# Patient Record
Sex: FEMALE | Race: ASIAN | NOT HISPANIC OR LATINO | Employment: FULL TIME | URBAN - METROPOLITAN AREA
[De-identification: names, ages, dates, MRNs, and addresses within clinical notes are randomized per-mention and may not be internally consistent; named-entity substitution may affect disease eponyms.]

---

## 2023-12-21 ENCOUNTER — APPOINTMENT (OUTPATIENT)
Dept: CARDIOLOGY | Facility: HOSPITAL | Age: 38
End: 2023-12-21
Payer: COMMERCIAL

## 2023-12-21 ENCOUNTER — HOSPITAL ENCOUNTER (EMERGENCY)
Facility: HOSPITAL | Age: 38
Discharge: OTHER INSTITUTION | End: 2023-12-21
Attending: EMERGENCY MEDICINE
Payer: COMMERCIAL

## 2023-12-21 ENCOUNTER — HOSPITAL ENCOUNTER (OUTPATIENT)
Facility: HOSPITAL | Age: 38
Setting detail: OBSERVATION
End: 2023-12-21
Attending: OBSTETRICS & GYNECOLOGY | Admitting: OBSTETRICS & GYNECOLOGY
Payer: COMMERCIAL

## 2023-12-21 ENCOUNTER — HOSPITAL ENCOUNTER (OUTPATIENT)
Facility: HOSPITAL | Age: 38
Setting detail: OBSERVATION
Discharge: HOME | End: 2023-12-21
Attending: OBSTETRICS & GYNECOLOGY | Admitting: OBSTETRICS & GYNECOLOGY
Payer: COMMERCIAL

## 2023-12-21 VITALS
TEMPERATURE: 97.9 F | WEIGHT: 156.53 LBS | SYSTOLIC BLOOD PRESSURE: 93 MMHG | HEART RATE: 80 BPM | OXYGEN SATURATION: 100 % | RESPIRATION RATE: 22 BRPM | DIASTOLIC BLOOD PRESSURE: 60 MMHG

## 2023-12-21 VITALS
SYSTOLIC BLOOD PRESSURE: 101 MMHG | HEART RATE: 87 BPM | DIASTOLIC BLOOD PRESSURE: 60 MMHG | WEIGHT: 156.53 LBS | BODY MASS INDEX: 30.73 KG/M2 | OXYGEN SATURATION: 100 % | HEIGHT: 60 IN

## 2023-12-21 DIAGNOSIS — Z3A.28 28 WEEKS GESTATION OF PREGNANCY (HHS-HCC): Primary | ICD-10-CM

## 2023-12-21 DIAGNOSIS — U07.1 COVID-19 VIRUS INFECTION: ICD-10-CM

## 2023-12-21 DIAGNOSIS — R10.9 ABDOMINAL PAIN DURING PREGNANCY IN THIRD TRIMESTER (HHS-HCC): ICD-10-CM

## 2023-12-21 DIAGNOSIS — R55 SYNCOPE AND COLLAPSE: Primary | ICD-10-CM

## 2023-12-21 DIAGNOSIS — O26.893 ABDOMINAL PAIN DURING PREGNANCY IN THIRD TRIMESTER (HHS-HCC): ICD-10-CM

## 2023-12-21 DIAGNOSIS — O23.43 URINARY TRACT INFECTION IN MOTHER DURING THIRD TRIMESTER OF PREGNANCY (HHS-HCC): ICD-10-CM

## 2023-12-21 PROBLEM — R73.09 ELEVATED GLUCOSE TOLERANCE TEST: Status: ACTIVE | Noted: 2023-12-21

## 2023-12-21 PROBLEM — O44.40 LOW-LYING PLACENTA (HHS-HCC): Status: ACTIVE | Noted: 2023-10-31

## 2023-12-21 PROBLEM — O99.619 GASTROESOPHAGEAL REFLUX IN PREGNANCY (HHS-HCC): Status: ACTIVE | Noted: 2023-09-11

## 2023-12-21 PROBLEM — K21.9 GASTROESOPHAGEAL REFLUX IN PREGNANCY (HHS-HCC): Status: ACTIVE | Noted: 2023-09-11

## 2023-12-21 PROBLEM — O98.513 COVID-19 AFFECTING PREGNANCY IN THIRD TRIMESTER (HHS-HCC): Status: ACTIVE | Noted: 2023-12-21

## 2023-12-21 LAB
ALBUMIN SERPL BCP-MCNC: 3.1 G/DL (ref 3.4–5)
ALP SERPL-CCNC: 63 U/L (ref 33–110)
ALT SERPL W P-5'-P-CCNC: 17 U/L (ref 7–45)
ANION GAP SERPL CALC-SCNC: 11 MMOL/L (ref 10–20)
AST SERPL W P-5'-P-CCNC: 20 U/L (ref 9–39)
BASOPHILS # BLD AUTO: 0.02 X10*3/UL (ref 0–0.1)
BASOPHILS NFR BLD AUTO: 0.3 %
BILIRUB SERPL-MCNC: 0.4 MG/DL (ref 0–1.2)
BUN SERPL-MCNC: 9 MG/DL (ref 6–23)
CALCIUM SERPL-MCNC: 7.6 MG/DL (ref 8.6–10.3)
CARDIAC TROPONIN I PNL SERPL HS: <3 NG/L (ref 0–13)
CHLORIDE SERPL-SCNC: 109 MMOL/L (ref 98–107)
CO2 SERPL-SCNC: 19 MMOL/L (ref 21–32)
CREAT SERPL-MCNC: 0.44 MG/DL (ref 0.5–1.05)
EOSINOPHIL # BLD AUTO: 0.12 X10*3/UL (ref 0–0.7)
EOSINOPHIL NFR BLD AUTO: 1.5 %
ERYTHROCYTE [DISTWIDTH] IN BLOOD BY AUTOMATED COUNT: 14.4 % (ref 11.5–14.5)
FLUAV RNA RESP QL NAA+PROBE: NOT DETECTED
FLUBV RNA RESP QL NAA+PROBE: NOT DETECTED
GFR SERPL CREATININE-BSD FRML MDRD: >90 ML/MIN/1.73M*2
GLUCOSE BLD MANUAL STRIP-MCNC: 132 MG/DL (ref 74–99)
GLUCOSE SERPL-MCNC: 125 MG/DL (ref 74–99)
HCT VFR BLD AUTO: 32.2 % (ref 36–46)
HGB BLD-MCNC: 10.5 G/DL (ref 12–16)
HOLD SPECIMEN: NORMAL
IMM GRANULOCYTES # BLD AUTO: 0.05 X10*3/UL (ref 0–0.7)
IMM GRANULOCYTES NFR BLD AUTO: 0.6 % (ref 0–0.9)
LYMPHOCYTES # BLD AUTO: 1.34 X10*3/UL (ref 1.2–4.8)
LYMPHOCYTES NFR BLD AUTO: 16.9 %
MCH RBC QN AUTO: 30 PG (ref 26–34)
MCHC RBC AUTO-ENTMCNC: 32.6 G/DL (ref 32–36)
MCV RBC AUTO: 92 FL (ref 80–100)
MONOCYTES # BLD AUTO: 0.4 X10*3/UL (ref 0.1–1)
MONOCYTES NFR BLD AUTO: 5 %
NEUTROPHILS # BLD AUTO: 6.01 X10*3/UL (ref 1.2–7.7)
NEUTROPHILS NFR BLD AUTO: 75.7 %
NRBC BLD-RTO: 0 /100 WBCS (ref 0–0)
PLATELET # BLD AUTO: 200 X10*3/UL (ref 150–450)
POC APPEARANCE, URINE: CLEAR
POC BILIRUBIN, URINE: NEGATIVE
POC BLOOD, URINE: NEGATIVE
POC COLOR, URINE: YELLOW
POC GLUCOSE, URINE: NEGATIVE MG/DL
POC KETONES, URINE: NEGATIVE MG/DL
POC LEUKOCYTES, URINE: ABNORMAL
POC NITRITE,URINE: POSITIVE
POC PH, URINE: 6.5 PH
POC PROTEIN, URINE: NEGATIVE MG/DL
POC SPECIFIC GRAVITY, URINE: 1.02
POC UROBILINOGEN, URINE: 0.2 EU/DL
POTASSIUM SERPL-SCNC: 3.3 MMOL/L (ref 3.5–5.3)
PROT SERPL-MCNC: 5.4 G/DL (ref 6.4–8.2)
RBC # BLD AUTO: 3.5 X10*6/UL (ref 4–5.2)
S PYO DNA THROAT QL NAA+PROBE: NOT DETECTED
SARS-COV-2 RNA RESP QL NAA+PROBE: DETECTED
SODIUM SERPL-SCNC: 136 MMOL/L (ref 136–145)
WBC # BLD AUTO: 7.9 X10*3/UL (ref 4.4–11.3)

## 2023-12-21 PROCEDURE — 2500000004 HC RX 250 GENERAL PHARMACY W/ HCPCS (ALT 636 FOR OP/ED)

## 2023-12-21 PROCEDURE — 87636 SARSCOV2 & INF A&B AMP PRB: CPT | Performed by: EMERGENCY MEDICINE

## 2023-12-21 PROCEDURE — 99213 OFFICE O/P EST LOW 20 MIN: CPT

## 2023-12-21 PROCEDURE — 81002 URINALYSIS NONAUTO W/O SCOPE: CPT

## 2023-12-21 PROCEDURE — 99291 CRITICAL CARE FIRST HOUR: CPT | Performed by: EMERGENCY MEDICINE

## 2023-12-21 PROCEDURE — G0379 DIRECT REFER HOSPITAL OBSERV: HCPCS

## 2023-12-21 PROCEDURE — 59025 FETAL NON-STRESS TEST: CPT

## 2023-12-21 PROCEDURE — 87186 SC STD MICRODIL/AGAR DIL: CPT

## 2023-12-21 PROCEDURE — 80053 COMPREHEN METABOLIC PANEL: CPT | Performed by: EMERGENCY MEDICINE

## 2023-12-21 PROCEDURE — 36415 COLL VENOUS BLD VENIPUNCTURE: CPT | Performed by: EMERGENCY MEDICINE

## 2023-12-21 PROCEDURE — 93005 ELECTROCARDIOGRAM TRACING: CPT

## 2023-12-21 PROCEDURE — G0378 HOSPITAL OBSERVATION PER HR: HCPCS

## 2023-12-21 PROCEDURE — 99215 OFFICE O/P EST HI 40 MIN: CPT | Mod: 25

## 2023-12-21 PROCEDURE — 84484 ASSAY OF TROPONIN QUANT: CPT | Performed by: EMERGENCY MEDICINE

## 2023-12-21 PROCEDURE — 85025 COMPLETE CBC W/AUTO DIFF WBC: CPT | Performed by: EMERGENCY MEDICINE

## 2023-12-21 PROCEDURE — 99292 CRITICAL CARE ADDL 30 MIN: CPT | Performed by: EMERGENCY MEDICINE

## 2023-12-21 PROCEDURE — 87651 STREP A DNA AMP PROBE: CPT | Performed by: EMERGENCY MEDICINE

## 2023-12-21 PROCEDURE — 99285 EMERGENCY DEPT VISIT HI MDM: CPT | Mod: 25

## 2023-12-21 PROCEDURE — 82947 ASSAY GLUCOSE BLOOD QUANT: CPT | Mod: 59

## 2023-12-21 RX ORDER — HYDRALAZINE HYDROCHLORIDE 20 MG/ML
5 INJECTION INTRAMUSCULAR; INTRAVENOUS ONCE AS NEEDED
Status: DISCONTINUED | OUTPATIENT
Start: 2023-12-21 | End: 2023-12-21 | Stop reason: HOSPADM

## 2023-12-21 RX ORDER — LABETALOL HYDROCHLORIDE 5 MG/ML
20 INJECTION, SOLUTION INTRAVENOUS ONCE AS NEEDED
Status: DISCONTINUED | OUTPATIENT
Start: 2023-12-21 | End: 2023-12-21 | Stop reason: HOSPADM

## 2023-12-21 RX ORDER — NIFEDIPINE 10 MG/1
10 CAPSULE ORAL ONCE AS NEEDED
Status: DISCONTINUED | OUTPATIENT
Start: 2023-12-21 | End: 2023-12-21 | Stop reason: HOSPADM

## 2023-12-21 RX ORDER — POTASSIUM CHLORIDE 20 MEQ/1
40 TABLET, EXTENDED RELEASE ORAL ONCE
Status: COMPLETED | OUTPATIENT
Start: 2023-12-21 | End: 2023-12-21

## 2023-12-21 RX ORDER — LIDOCAINE HYDROCHLORIDE 10 MG/ML
0.5 INJECTION INFILTRATION; PERINEURAL ONCE AS NEEDED
Status: DISCONTINUED | OUTPATIENT
Start: 2023-12-21 | End: 2023-12-21 | Stop reason: HOSPADM

## 2023-12-21 RX ORDER — NITROFURANTOIN 25; 75 MG/1; MG/1
100 CAPSULE ORAL 2 TIMES DAILY
Qty: 10 CAPSULE | Refills: 0 | Status: SHIPPED | OUTPATIENT
Start: 2023-12-21 | End: 2023-12-26

## 2023-12-21 RX ADMIN — POTASSIUM CHLORIDE 40 MEQ: 1500 TABLET, EXTENDED RELEASE ORAL at 17:02

## 2023-12-21 SDOH — SOCIAL STABILITY: SOCIAL INSECURITY: DOES ANYONE TRY TO KEEP YOU FROM HAVING/CONTACTING OTHER FRIENDS OR DOING THINGS OUTSIDE YOUR HOME?: NO

## 2023-12-21 SDOH — SOCIAL STABILITY: SOCIAL INSECURITY: DO YOU FEEL ANYONE HAS EXPLOITED OR TAKEN ADVANTAGE OF YOU FINANCIALLY OR OF YOUR PERSONAL PROPERTY?: NO

## 2023-12-21 SDOH — ECONOMIC STABILITY: HOUSING INSECURITY: DO YOU FEEL UNSAFE GOING BACK TO THE PLACE WHERE YOU ARE LIVING?: NO

## 2023-12-21 SDOH — SOCIAL STABILITY: SOCIAL INSECURITY: ABUSE SCREEN: ADULT

## 2023-12-21 SDOH — SOCIAL STABILITY: SOCIAL INSECURITY: VERBAL ABUSE: DENIES

## 2023-12-21 SDOH — SOCIAL STABILITY: SOCIAL INSECURITY: PHYSICAL ABUSE: DENIES

## 2023-12-21 SDOH — HEALTH STABILITY: MENTAL HEALTH: WERE YOU ABLE TO COMPLETE ALL THE BEHAVIORAL HEALTH SCREENINGS?: YES

## 2023-12-21 SDOH — HEALTH STABILITY: MENTAL HEALTH: HAVE YOU USED ANY SUBSTANCES (CANABIS, COCAINE, HEROIN, HALLUCINOGENS, INHALANTS, ETC.) IN THE PAST 12 MONTHS?: NO

## 2023-12-21 SDOH — HEALTH STABILITY: MENTAL HEALTH: WISH TO BE DEAD (PAST 1 MONTH): NO

## 2023-12-21 SDOH — SOCIAL STABILITY: SOCIAL INSECURITY: HAVE YOU HAD THOUGHTS OF HARMING ANYONE ELSE?: NO

## 2023-12-21 SDOH — SOCIAL STABILITY: SOCIAL INSECURITY: ARE THERE ANY APPARENT SIGNS OF INJURIES/BEHAVIORS THAT COULD BE RELATED TO ABUSE/NEGLECT?: NO

## 2023-12-21 SDOH — HEALTH STABILITY: MENTAL HEALTH: NON-SPECIFIC ACTIVE SUICIDAL THOUGHTS (PAST 1 MONTH): NO

## 2023-12-21 SDOH — SOCIAL STABILITY: SOCIAL INSECURITY: ARE YOU OR HAVE YOU BEEN THREATENED OR ABUSED PHYSICALLY, EMOTIONALLY, OR SEXUALLY BY ANYONE?: NO

## 2023-12-21 SDOH — HEALTH STABILITY: MENTAL HEALTH: SUICIDAL BEHAVIOR (LIFETIME): NO

## 2023-12-21 SDOH — SOCIAL STABILITY: SOCIAL INSECURITY: HAS ANYONE EVER THREATENED TO HURT YOUR FAMILY OR YOUR PETS?: NO

## 2023-12-21 SDOH — HEALTH STABILITY: MENTAL HEALTH: HAVE YOU USED ANY PRESCRIPTION DRUGS OTHER THAN PRESCRIBED IN THE PAST 12 MONTHS?: NO

## 2023-12-21 ASSESSMENT — PAIN - FUNCTIONAL ASSESSMENT: PAIN_FUNCTIONAL_ASSESSMENT: 0-10

## 2023-12-21 ASSESSMENT — LIFESTYLE VARIABLES
HOW MANY STANDARD DRINKS CONTAINING ALCOHOL DO YOU HAVE ON A TYPICAL DAY: PATIENT DOES NOT DRINK
AUDIT-C TOTAL SCORE: 0
HOW OFTEN DO YOU HAVE A DRINK CONTAINING ALCOHOL: NEVER
HOW OFTEN DO YOU HAVE 6 OR MORE DRINKS ON ONE OCCASION: NEVER
AUDIT-C TOTAL SCORE: 0
SKIP TO QUESTIONS 9-10: 1

## 2023-12-21 ASSESSMENT — PATIENT HEALTH QUESTIONNAIRE - PHQ9
2. FEELING DOWN, DEPRESSED OR HOPELESS: NOT AT ALL
1. LITTLE INTEREST OR PLEASURE IN DOING THINGS: NOT AT ALL
SUM OF ALL RESPONSES TO PHQ9 QUESTIONS 1 & 2: 0

## 2023-12-21 ASSESSMENT — PAIN DESCRIPTION - LOCATION: LOCATION: ABDOMEN

## 2023-12-21 NOTE — ED PROVIDER NOTES
HPI   Chief Complaint   Patient presents with    Syncope       38-year-old female G1, P0 at 29 weeks by ultrasound yesterday.  She had a syncopal event today reportedly while waiting in the lobby for glucose test.  It is unclear whether she actually drink the solution before hand.  She does not speak English but the [employer]  service was used.  She is here with her .  She had a syncopal event but states that she felt fine before hand.  They are unsure which hospital they were planning to deliver at.  She started having abdominal cramping and pain afterwards.  She denies loss of fluid or vaginal bleeding.      History provided by:  Spouse and EMS personnel   used: Yes                        Rosaura Coma Scale Score: 15                  Patient History   No past medical history on file.  No past surgical history on file.  No family history on file.  Social History     Tobacco Use    Smoking status: Not on file    Smokeless tobacco: Not on file   Substance Use Topics    Alcohol use: Not on file    Drug use: Not on file       Physical Exam   ED Triage Vitals   Temp Heart Rate Resp BP   12/21/23 1157 12/21/23 1157 12/21/23 1157 12/21/23 1159   36.6 °C (97.9 °F) 85 18 104/69      SpO2 Temp Source Heart Rate Source Patient Position   12/21/23 1159 12/21/23 1157 12/21/23 1157 --   99 % Temporal Monitor       BP Location FiO2 (%)     -- --             Physical Exam  Vitals and nursing note reviewed.   Constitutional:       General: She is in acute distress.      Appearance: She is well-developed. She is ill-appearing.   HENT:      Head: Normocephalic and atraumatic.   Eyes:      Conjunctiva/sclera: Conjunctivae normal.   Cardiovascular:      Rate and Rhythm: Normal rate and regular rhythm.      Heart sounds: No murmur heard.  Pulmonary:      Effort: Pulmonary effort is normal. No respiratory distress.      Breath sounds: Normal breath sounds.   Abdominal:      Tenderness: There is no  abdominal tenderness.      Comments: Gravid uterus   Musculoskeletal:         General: No swelling.      Cervical back: Neck supple.   Skin:     General: Skin is warm and dry.      Capillary Refill: Capillary refill takes less than 2 seconds.   Neurological:      Mental Status: She is alert.   Psychiatric:         Mood and Affect: Mood normal.         ED Course & MDM   ED Course as of 12/21/23 1256   Thu Dec 21, 2023   1204 Patient was accepted by Dr. Alexander at [facility]. [CD]   1246 Resting comfortably.  Pain has resolved.  No loss of fluids or bleeding.  Ultrasound reveals fetal movement and cardiac activity. [CD]      ED Course User Index  [CD] Malik Londono MD         Diagnoses as of 12/21/23 1256   Syncope and collapse   Abdominal pain during pregnancy in third trimester       Medical Decision Making  I performed a bedside ultrasound.  Fetal heart tones are 140s.  There is active fetal movement.  I discussed the case with Dr. Alexander at [facility] and she was accepted for emergent transport.  She appears stable for transport at this time.  She was accepted at [facility].    EKG interpreted by attending physician.  Sinus rhythm.  Rate 89.  No acute ischemic changes.  No ST elevation.  QTc is 418.        Procedure  Critical Care    Performed by: Malik Londono MD  Authorized by: Malik Londono MD    Critical care provider statement:     Critical care time (minutes):  35    Critical care time was exclusive of:  Separately billable procedures and treating other patients    Critical care was time spent personally by me on the following activities:  Discussions with consultants, discussions with primary provider, evaluation of patient's response to treatment, examination of patient, re-evaluation of patient's condition, ordering and review of radiographic studies, ordering and review of laboratory studies and ordering and performing treatments and interventions    I assumed direction of  critical care for this patient from another provider in my specialty: yes         Malik Londono MD  12/21/23 1210       Malik Londono MD  12/21/23 1257

## 2023-12-21 NOTE — ED TRIAGE NOTES
Pt to ED via medic c/o syncopal episode during glucose test. Pt is 29 weeks pregnant c/o abdominal pain that started about 10 minutes pta. First term pregnancy for patient.  states she was feeling fine this morning prior to testing. Pt speaks [place] and  was used. Provider at bedside with US. Fetal heart tones =140. Pt pale diaphoretic and alert to speech/stimuli

## 2023-12-22 NOTE — H&P
Obstetrical Admission History and Physical     Alverto Sow is a 38 y.o.  at 28.5 wga by chart review presenting to OB TRIAGE from [place] ED after presyncopal episode    Chief Complaint: No chief complaint on file.    Assessment/Plan      Presyncope, COVID infection, UTI  - EKG NSR, no ST changes at [place] [facility]  - troponin negative  - s/p IV fluids on arrival to OB triage  - Orthostatics negative  - urine dip s/f SG 1.020 and + nitrites  - presyncope in s/o COVID illness, likely dehydration  - presumptively treating for UTI, will follow up urine culture  - macrobid x 5 days sent to pharmacy  - COVID PCR positive  - Congestion and sore throat x 2 days, candidate for Paxlovid  - counseled on Paxlovid, patient accepts, rx sent  - tylenol, rest, fluids encouraged  - note for work provided- mask x 5 days after returning to work no sooner than after day 5 of symptom onset  - counseled on precautions for partner: mask x 10 days  - visit conducted with [employer]  through [employer]  - return precautions reviewed, patient verbalizes understanding    IUP at 28.5 wga  - NST appropriate for gestational age   - Good fetal movement  - Continue routine prenatal care  - Next appt with [facility] for PNC    Maternal Well-being  - Vital signs stable and WNL  - All questions and concerns addressed     Dispo  - patient appropriate for d/c home, agrees with plan  - f/u at next scheduled OB appt or to triage sooner as needed     Tracing and plan reviewed with Dr. Alexander    Active Problems:    Elevated glucose tolerance test    COVID-19 affecting pregnancy in third trimester    Urinary tract infection in mother during third trimester of pregnancy    Syncope, near      Pregnancy Problems (from 23 to present)       No problems associated with this episode.          Subjective   Alverto is here after presyncopal event while presenting for 3 hr GTT. Good fetal movement. Denies vaginal bleeding., Denies contractions., Denies leaking of  fluid.      Patient reports feeling weak and had ringing in her ears when she tried to drink the glucose drink. She said her blood pressure was low. She had abdominal pain twice in the ambulance on the way here, lasted for about 1 min, has not experienced it since.      Obstetrical History   OB History    Para Term  AB Living   2       1     SAB IAB Ectopic Multiple Live Births   1              # Outcome Date GA Lbr Sushil/2nd Weight Sex Delivery Anes PTL Lv   2 Current            1 SAB                Past Medical History  No past medical history on file.     Past Surgical History   No past surgical history on file.    Social History  Social History     Tobacco Use    Smoking status: Not on file    Smokeless tobacco: Not on file   Substance Use Topics    Alcohol use: Not on file     Substance and Sexual Activity   Drug Use Not on file       Allergies  Patient has no known allergies.     Medications  No medications prior to admission.       Objective    Last Vitals  Temp Pulse Resp BP MAP O2 Sat     87   101/60 (standing)   100 %     Physical Examination  FHR is 135 , with Accelerations, and a category I  tracing.    [place] reading:  no ctx  General: Examination reveals a well developed, well nourished, female, in no acute distress. She is alert and cooperative.  Lungs: symmetrical, non-labored breathing.  Cardiac: warm, well-perfused.  Abdomen: soft, non-tender, gravid. Suprapubic tenderness  Extremities: no redness or tenderness in the calves or thighs. Negative CVA tenderness  Neurological: alert, oriented, normal speech, no focal findings or movement disorder noted.     Non-Stress Test   Baseline Fetal Heart Rate for Non-Stress Test: 135 BPM  Variability in Waveform for Non-Stress Test: Moderate  Accelerations in Non-Stress Test: Yes, lasting at least 10 seconds  Decelerations in Non-Stress Test: None  Contractions in Non-Stress Test: Not present  Acoustic Stimulator for Non-Stress Test:  No  Interpretation of Non-Stress Test   Interpretation of Non-Stress Test: Reactive   Lab Review  Labs in chart were reviewed.

## 2023-12-23 ENCOUNTER — TELEPHONE (OUTPATIENT)
Dept: OBSTETRICS AND GYNECOLOGY | Facility: HOSPITAL | Age: 38
End: 2023-12-23
Payer: COMMERCIAL

## 2023-12-23 LAB — BACTERIA UR CULT: ABNORMAL

## 2023-12-23 NOTE — TELEPHONE ENCOUNTER
Attempted to call pt to review urine cx results. Pt was seen in triage on 12/21 and script for Macrobid was sent. Macrobid good tx based on sensitivities.     Pt's  answered the phone. No information was discussed. Will attempt to call pt back.    Gisela Freedman, KIAN-CNP

## 2024-01-10 LAB
ATRIAL RATE: 89 BPM
P AXIS: 80 DEGREES
P OFFSET: 200 MS
P ONSET: 148 MS
PR INTERVAL: 158 MS
Q ONSET: 227 MS
QRS COUNT: 14 BEATS
QRS DURATION: 72 MS
QT INTERVAL: 344 MS
QTC CALCULATION(BAZETT): 418 MS
QTC FREDERICIA: 392 MS
R AXIS: 12 DEGREES
T AXIS: 31 DEGREES
T OFFSET: 399 MS
VENTRICULAR RATE: 89 BPM

## 2024-02-14 ENCOUNTER — HOSPITAL ENCOUNTER (EMERGENCY)
Facility: HOSPITAL | Age: 39
Discharge: LEFT BEFORE DISCHARGE | End: 2024-02-14
Attending: INTERNAL MEDICINE
Payer: COMMERCIAL

## 2024-02-14 VITALS
BODY MASS INDEX: 27.6 KG/M2 | SYSTOLIC BLOOD PRESSURE: 106 MMHG | WEIGHT: 150 LBS | HEART RATE: 81 BPM | HEIGHT: 62 IN | RESPIRATION RATE: 18 BRPM | OXYGEN SATURATION: 99 % | TEMPERATURE: 98.1 F | DIASTOLIC BLOOD PRESSURE: 67 MMHG

## 2024-02-14 DIAGNOSIS — O36.8130 DECREASED FETAL MOVEMENTS IN THIRD TRIMESTER, SINGLE OR UNSPECIFIED FETUS (HHS-HCC): Primary | ICD-10-CM

## 2024-02-14 PROCEDURE — 76705 ECHO EXAM OF ABDOMEN: CPT | Performed by: INTERNAL MEDICINE

## 2024-02-14 PROCEDURE — 99285 EMERGENCY DEPT VISIT HI MDM: CPT | Mod: 25 | Performed by: INTERNAL MEDICINE

## 2024-02-14 PROCEDURE — 99284 EMERGENCY DEPT VISIT MOD MDM: CPT | Mod: 25

## 2024-02-14 ASSESSMENT — COLUMBIA-SUICIDE SEVERITY RATING SCALE - C-SSRS
1. IN THE PAST MONTH, HAVE YOU WISHED YOU WERE DEAD OR WISHED YOU COULD GO TO SLEEP AND NOT WAKE UP?: NO
6. HAVE YOU EVER DONE ANYTHING, STARTED TO DO ANYTHING, OR PREPARED TO DO ANYTHING TO END YOUR LIFE?: NO
2. HAVE YOU ACTUALLY HAD ANY THOUGHTS OF KILLING YOURSELF?: NO

## 2024-02-14 ASSESSMENT — PAIN DESCRIPTION - PROGRESSION: CLINICAL_PROGRESSION: NOT CHANGED

## 2024-02-14 ASSESSMENT — PAIN - FUNCTIONAL ASSESSMENT: PAIN_FUNCTIONAL_ASSESSMENT: 0-10

## 2024-02-14 ASSESSMENT — LIFESTYLE VARIABLES
HAVE YOU EVER FELT YOU SHOULD CUT DOWN ON YOUR DRINKING: NO
EVER HAD A DRINK FIRST THING IN THE MORNING TO STEADY YOUR NERVES TO GET RID OF A HANGOVER: NO
HAVE PEOPLE ANNOYED YOU BY CRITICIZING YOUR DRINKING: NO
EVER FELT BAD OR GUILTY ABOUT YOUR DRINKING: NO

## 2024-02-14 ASSESSMENT — PAIN SCALES - GENERAL: PAINLEVEL_OUTOF10: 0 - NO PAIN

## 2024-02-15 NOTE — ED PROVIDER NOTES
HPI   Chief Complaint   Patient presents with    Pregnancy Problem     Patient presents via triage from home. Pt sts she is 9mos pregnant and has not felt baby move in 2 hours. Pt sts had  Appointment today w/ ultrasound and was told all vitals and OB normal. Pt ambulated from triage. POC ultrasound conducted by provider at bedside fetal .         services were utilized for this emergency room evaluation.    Patient is anxious appearing 39-year-old female with past medical history of esophageal reflux, overlying send, COVID-19, UTI, near syncope, presents to the emergency today for complaint of decreased fetal movement.  Patient states earlier today she began to feel decreased movement during pregnancy and she is concerned fetus may be experiencing complications.  Patient denies any abdominal pain with nausea vomiting or diarrhea or constipation.  Patient denies any vaginal discharge or vaginal bleeding.  Patient denies any urinary complaints.  Patient states she was seen and evaluated by OB/GYN earlier today and was told evaluation was normal and to follow-up as needed.  Patient states she does have appointment see OB/GYN tomorrow.  Discussed with                          Rosaura Coma Scale Score: 15                     Patient History   No past medical history on file.  No past surgical history on file.  No family history on file.  Social History     Tobacco Use    Smoking status: Not on file    Smokeless tobacco: Not on file   Substance Use Topics    Alcohol use: Not on file    Drug use: Not on file       Physical Exam   ED Triage Vitals [02/14/24 2320]   Temperature Heart Rate Respirations BP   36.7 °C (98.1 °F) 83 18 128/81      Pulse Ox Temp Source Heart Rate Source Patient Position   100 % Temporal Monitor Lying      BP Location FiO2 (%)     -- --       Physical Exam  Vitals and nursing note reviewed. Exam conducted with a chaperone present.   Constitutional:       General: She is not in  acute distress.     Appearance: Normal appearance. She is not ill-appearing, toxic-appearing or diaphoretic.   HENT:      Head: Normocephalic.      Nose: Nose normal.      Mouth/Throat:      Mouth: Mucous membranes are moist.   Eyes:      Extraocular Movements: Extraocular movements intact.      Pupils: Pupils are equal, round, and reactive to light.   Cardiovascular:      Rate and Rhythm: Normal rate and regular rhythm.      Pulses: Normal pulses.      Heart sounds: Normal heart sounds. No murmur heard.     No friction rub. No gallop.   Pulmonary:      Effort: Pulmonary effort is normal. No respiratory distress.      Breath sounds: Normal breath sounds. No stridor. No wheezing, rhonchi or rales.   Chest:      Chest wall: No tenderness.   Abdominal:      General: Bowel sounds are normal. There is no distension.      Palpations: There is no mass.      Tenderness: There is no abdominal tenderness. There is no right CVA tenderness, left CVA tenderness, guarding or rebound.      Hernia: No hernia is present.   Musculoskeletal:         General: Normal range of motion.      Cervical back: Normal range of motion and neck supple.   Skin:     General: Skin is warm and dry.      Capillary Refill: Capillary refill takes less than 2 seconds.      Coloration: Skin is not jaundiced or pale.      Findings: No bruising, erythema, lesion or rash.   Neurological:      Mental Status: She is alert.         ED Course & MDM   ED Course as of 02/14/24 2349 Wed Feb 14, 2024 2343 Discussed with Dr. Cheema (OB) and it is recommended that the patient be transported for NST.  Patient does not wish to go by ambulance.  Patient wishes to have her spouse drive her to [facility] as this is where she has her care provided. [JA]   2344 I discussed further with the patient using  service regarding the potential risk and benefits of going by private vehicle and that this would be AGAINST MEDICAL ADVICE. [JA]   2345 After  discussion of leaving AGAINST MEDICAL ADVICE patient indicates that she now feels the child moving.  Patient notes that she does have follow-up with OB tomorrow.  I have again stressed that this would be AGAINST MEDICAL ADVICE that the patient does need to follow-up as soon as possible.  It is recommended the patient go directly to [facility] or [facility] for further evaluation. [JA]      ED Course User Index  [JA] Burke RIVERO DO Memo         Diagnoses as of 24 2349   Decreased fetal movements in third trimester, single or unspecified fetus       Medical Decision Making  Given patient's complaint presentation a thorough exam was performed.  Upon arrival emergency room patient was tearful and upset that there was decreased fetal movement.  Fetal heart tones were performed bedside the ultrasound does reveal fetal heart tone of 132.  Patient has no reproduced abdominal tenderness upon palpation bowel sounds present all 4 quadrants, no CVA tenderness upon palpation, denies any vaginal discharge or vaginal bleeding, previous documentation from evaluation earlier today was reviewed as well and does confirm patient is estimated 36 weeks and 4-days pregnant.  Patient states this is her first pregnancy,  1 para 0.  [facility] was utilized to consult OB/GYN at [facility].  OB/GYN recommends evaluation at [facility].  This information was not relayed to patient.  Patient was reluctant to stop this information is opted to go to [facility] where her OB/GYN practices.  Risks and benefits were discussed with patient regards to leaving without being evaluated or without having medical transportation.  Patient has full mental capacity understands these risks and leaving AGAINST MEDICAL ADVICE.  Risks associated without being evaluated including short-term disability, long-term disability, fetal complications, death were discussed.  Patient is still refusing evaluation and is  signed AGAINST MEDICAL ADVICE form.  Patient has signed AGAINST MEDICAL ADVICE form.    VALORIE Porras     Portions of this note were generated using digital voice recognition software, and may contain grammatical errors      Procedure  Procedures     VALORIE Porras  02/14/24 7932

## 2024-02-15 NOTE — ED PROCEDURE NOTE
Procedure    Performed by: Burke Hampton DO  Authorized by: Burke Hampton DO        Genitourinary Indications: evaluation for fetal cardiac activity            Comments: Point-of-care abdominal OB ultrasound performed as the patient is concerned that she is not having any fetal activity.  While I do not see overt fetal motion there is fetal heart tones.  Fetal heart rate 132.  Fetus is head down into the pelvis.  Patient indicates this was the position of the fetus this morning at previous ultrasound.               Burke Hampton DO  02/14/24 4428

## 2024-03-06 ENCOUNTER — APPOINTMENT (OUTPATIENT)
Dept: RADIOLOGY | Facility: HOSPITAL | Age: 39
End: 2024-03-06
Payer: COMMERCIAL

## 2024-03-06 ENCOUNTER — HOSPITAL ENCOUNTER (EMERGENCY)
Facility: HOSPITAL | Age: 39
Discharge: LEFT BEFORE DISCHARGE | End: 2024-03-07
Attending: STUDENT IN AN ORGANIZED HEALTH CARE EDUCATION/TRAINING PROGRAM
Payer: COMMERCIAL

## 2024-03-06 DIAGNOSIS — R33.9 URINARY RETENTION: ICD-10-CM

## 2024-03-06 DIAGNOSIS — K59.00 CONSTIPATION, UNSPECIFIED CONSTIPATION TYPE: Primary | ICD-10-CM

## 2024-03-06 DIAGNOSIS — R10.9 ABDOMINAL PAIN, UNSPECIFIED ABDOMINAL LOCATION: ICD-10-CM

## 2024-03-06 LAB
ALBUMIN SERPL BCP-MCNC: 3 G/DL (ref 3.4–5)
ALP SERPL-CCNC: 107 U/L (ref 33–110)
ALT SERPL W P-5'-P-CCNC: 24 U/L (ref 7–45)
ANION GAP SERPL CALC-SCNC: 15 MMOL/L (ref 10–20)
APPEARANCE UR: ABNORMAL
AST SERPL W P-5'-P-CCNC: 35 U/L (ref 9–39)
BASOPHILS # BLD AUTO: 0.04 X10*3/UL (ref 0–0.1)
BASOPHILS NFR BLD AUTO: 0.4 %
BILIRUB SERPL-MCNC: 0.4 MG/DL (ref 0–1.2)
BILIRUB UR STRIP.AUTO-MCNC: NEGATIVE MG/DL
BUN SERPL-MCNC: 7 MG/DL (ref 6–23)
CALCIUM SERPL-MCNC: 8.5 MG/DL (ref 8.6–10.3)
CHLORIDE SERPL-SCNC: 110 MMOL/L (ref 98–107)
CO2 SERPL-SCNC: 18 MMOL/L (ref 21–32)
COLOR UR: YELLOW
CREAT SERPL-MCNC: 0.53 MG/DL (ref 0.5–1.05)
EGFRCR SERPLBLD CKD-EPI 2021: >90 ML/MIN/1.73M*2
EOSINOPHIL # BLD AUTO: 0.15 X10*3/UL (ref 0–0.7)
EOSINOPHIL NFR BLD AUTO: 1.6 %
ERYTHROCYTE [DISTWIDTH] IN BLOOD BY AUTOMATED COUNT: 16 % (ref 11.5–14.5)
GLUCOSE SERPL-MCNC: 75 MG/DL (ref 74–99)
GLUCOSE UR STRIP.AUTO-MCNC: NEGATIVE MG/DL
HCT VFR BLD AUTO: 31 % (ref 36–46)
HGB BLD-MCNC: 10.2 G/DL (ref 12–16)
IMM GRANULOCYTES # BLD AUTO: 0.12 X10*3/UL (ref 0–0.7)
IMM GRANULOCYTES NFR BLD AUTO: 1.2 % (ref 0–0.9)
KETONES UR STRIP.AUTO-MCNC: NEGATIVE MG/DL
LEUKOCYTE ESTERASE UR QL STRIP.AUTO: ABNORMAL
LIPASE SERPL-CCNC: 34 U/L (ref 9–82)
LYMPHOCYTES # BLD AUTO: 1.34 X10*3/UL (ref 1.2–4.8)
LYMPHOCYTES NFR BLD AUTO: 13.9 %
MCH RBC QN AUTO: 29.3 PG (ref 26–34)
MCHC RBC AUTO-ENTMCNC: 32.9 G/DL (ref 32–36)
MCV RBC AUTO: 89 FL (ref 80–100)
MONOCYTES # BLD AUTO: 0.6 X10*3/UL (ref 0.1–1)
MONOCYTES NFR BLD AUTO: 6.2 %
NEUTROPHILS # BLD AUTO: 7.4 X10*3/UL (ref 1.2–7.7)
NEUTROPHILS NFR BLD AUTO: 76.7 %
NITRITE UR QL STRIP.AUTO: NEGATIVE
NRBC BLD-RTO: 0 /100 WBCS (ref 0–0)
PH UR STRIP.AUTO: 6 [PH]
PLATELET # BLD AUTO: 222 X10*3/UL (ref 150–450)
POTASSIUM SERPL-SCNC: 3.6 MMOL/L (ref 3.5–5.3)
PROT SERPL-MCNC: 5.5 G/DL (ref 6.4–8.2)
PROT UR STRIP.AUTO-MCNC: NEGATIVE MG/DL
RBC # BLD AUTO: 3.48 X10*6/UL (ref 4–5.2)
RBC # UR STRIP.AUTO: ABNORMAL /UL
RBC #/AREA URNS AUTO: >20 /HPF
SODIUM SERPL-SCNC: 139 MMOL/L (ref 136–145)
SP GR UR STRIP.AUTO: 1.01
SQUAMOUS #/AREA URNS AUTO: ABNORMAL /HPF
UROBILINOGEN UR STRIP.AUTO-MCNC: <2 MG/DL
WBC # BLD AUTO: 9.7 X10*3/UL (ref 4.4–11.3)
WBC #/AREA URNS AUTO: ABNORMAL /HPF

## 2024-03-06 PROCEDURE — 76856 US EXAM PELVIC COMPLETE: CPT | Performed by: RADIOLOGY

## 2024-03-06 PROCEDURE — 83690 ASSAY OF LIPASE: CPT | Performed by: STUDENT IN AN ORGANIZED HEALTH CARE EDUCATION/TRAINING PROGRAM

## 2024-03-06 PROCEDURE — 96365 THER/PROPH/DIAG IV INF INIT: CPT

## 2024-03-06 PROCEDURE — 74177 CT ABD & PELVIS W/CONTRAST: CPT

## 2024-03-06 PROCEDURE — 74177 CT ABD & PELVIS W/CONTRAST: CPT | Performed by: RADIOLOGY

## 2024-03-06 PROCEDURE — 36415 COLL VENOUS BLD VENIPUNCTURE: CPT | Performed by: STUDENT IN AN ORGANIZED HEALTH CARE EDUCATION/TRAINING PROGRAM

## 2024-03-06 PROCEDURE — 81001 URINALYSIS AUTO W/SCOPE: CPT | Performed by: STUDENT IN AN ORGANIZED HEALTH CARE EDUCATION/TRAINING PROGRAM

## 2024-03-06 PROCEDURE — 99285 EMERGENCY DEPT VISIT HI MDM: CPT | Mod: 25

## 2024-03-06 PROCEDURE — 80053 COMPREHEN METABOLIC PANEL: CPT | Performed by: STUDENT IN AN ORGANIZED HEALTH CARE EDUCATION/TRAINING PROGRAM

## 2024-03-06 PROCEDURE — 76856 US EXAM PELVIC COMPLETE: CPT

## 2024-03-06 PROCEDURE — 2550000001 HC RX 255 CONTRASTS: Performed by: STUDENT IN AN ORGANIZED HEALTH CARE EDUCATION/TRAINING PROGRAM

## 2024-03-06 PROCEDURE — 85025 COMPLETE CBC W/AUTO DIFF WBC: CPT | Performed by: STUDENT IN AN ORGANIZED HEALTH CARE EDUCATION/TRAINING PROGRAM

## 2024-03-06 RX ORDER — ACETAMINOPHEN 325 MG/1
650 TABLET ORAL ONCE
Status: COMPLETED | OUTPATIENT
Start: 2024-03-06 | End: 2024-03-06

## 2024-03-06 RX ORDER — LIDOCAINE HYDROCHLORIDE 20 MG/ML
1 JELLY TOPICAL ONCE
Status: COMPLETED | OUTPATIENT
Start: 2024-03-06 | End: 2024-03-06

## 2024-03-06 RX ORDER — CEFTRIAXONE 1 G/50ML
1 INJECTION, SOLUTION INTRAVENOUS ONCE
Status: COMPLETED | OUTPATIENT
Start: 2024-03-06 | End: 2024-03-07

## 2024-03-06 RX ADMIN — IOHEXOL 75 ML: 350 INJECTION, SOLUTION INTRAVENOUS at 22:43

## 2024-03-06 RX ADMIN — ACETAMINOPHEN 650 MG: 325 TABLET ORAL at 22:17

## 2024-03-06 ASSESSMENT — PAIN - FUNCTIONAL ASSESSMENT
PAIN_FUNCTIONAL_ASSESSMENT: 0-10

## 2024-03-06 ASSESSMENT — PAIN SCALES - GENERAL
PAINLEVEL_OUTOF10: 10 - WORST POSSIBLE PAIN
PAINLEVEL_OUTOF10: 10 - WORST POSSIBLE PAIN
PAINLEVEL_OUTOF10: 0 - NO PAIN
PAINLEVEL_OUTOF10: 10 - WORST POSSIBLE PAIN

## 2024-03-06 ASSESSMENT — PAIN DESCRIPTION - ORIENTATION: ORIENTATION: LOWER

## 2024-03-06 ASSESSMENT — PAIN DESCRIPTION - DESCRIPTORS: DESCRIPTORS: SHARP

## 2024-03-06 ASSESSMENT — PAIN DESCRIPTION - LOCATION
LOCATION: VAGINA
LOCATION: BACK
LOCATION: BACK

## 2024-03-07 VITALS
WEIGHT: 163.36 LBS | BODY MASS INDEX: 27.89 KG/M2 | DIASTOLIC BLOOD PRESSURE: 64 MMHG | HEART RATE: 75 BPM | SYSTOLIC BLOOD PRESSURE: 107 MMHG | OXYGEN SATURATION: 98 % | HEIGHT: 64 IN | RESPIRATION RATE: 18 BRPM

## 2024-03-07 PROCEDURE — 96365 THER/PROPH/DIAG IV INF INIT: CPT | Mod: 59

## 2024-03-07 PROCEDURE — 2500000004 HC RX 250 GENERAL PHARMACY W/ HCPCS (ALT 636 FOR OP/ED): Performed by: STUDENT IN AN ORGANIZED HEALTH CARE EDUCATION/TRAINING PROGRAM

## 2024-03-07 PROCEDURE — 2500000001 HC RX 250 WO HCPCS SELF ADMINISTERED DRUGS (ALT 637 FOR MEDICARE OP): Performed by: STUDENT IN AN ORGANIZED HEALTH CARE EDUCATION/TRAINING PROGRAM

## 2024-03-07 PROCEDURE — 51702 INSERT TEMP BLADDER CATH: CPT

## 2024-03-07 RX ORDER — SYRING-NEEDL,DISP,INSUL,0.3 ML 29 G X1/2"
296 SYRINGE, EMPTY DISPOSABLE MISCELLANEOUS ONCE
Status: COMPLETED | OUTPATIENT
Start: 2024-03-07 | End: 2024-03-07

## 2024-03-07 RX ADMIN — BE HEALTH MAGNESIUM CITRATE ORAL SOLUTION - CHERRY 296 ML: 1.75 LIQUID ORAL at 03:36

## 2024-03-07 RX ADMIN — CEFTRIAXONE SODIUM 1 G: 1 INJECTION, SOLUTION INTRAVENOUS at 00:52

## 2024-03-07 NOTE — ED PROVIDER NOTES
EMERGENCY DEPARTMENT ENCOUNTER      Pt Name: Alverto Sow  MRN: [id number]  Birthdate 1985  Date of evaluation: 3/6/2024  Provider: Riley Mcdermott DO    CHIEF COMPLAINT       Chief Complaint   Patient presents with    Constipation     Patient BIBA, EMS states per  patient is 3 days postpartum. Patient was discharged from [facility] today, after arriving at home patient began having lower back pain. Patient is rating pain 10/10 and states she hasn't had a bowel movement since Saturday.        HISTORY OF PRESENT ILLNESS    Alverto Sow is a 39 y.o. female who presents to the emergency department with  for lower pelvic pain starting this afternoon.  Patient states that she has not had a bowel movement since Saturday.  Of note she did give birth to a healthy child 3 days ago at [facility].  Denies taking any narcotic pain medications at this time.  Reports that she did go to [facility] emergency department this evening although was not seen by provider only blood work was drawn.  Describes her pain as an aching sensation localized to the lower quadrants.  Denies any vaginal discharge or bleeding no urinary symptoms no measured fevers at home.  Pregnancy in itself was noncomplicated.  Denies any previous abdominal surgeries.  No associated nausea or vomiting.  Patient is not primarily English speaking she was offered an  although declined she would prefer to have her  as well as friend over telephone interpret.  5 out of 5 strength at the hip knee ankle toe joints          Nursing Notes were reviewed.    REVIEW OF SYSTEMS     CONSTITUTIONAL: Denies fever, sweats, chills.   NEURO: Denies difficulty walking, numbness, weakness, tingling, headache.   HEENT: Denies sore throat, rhinorrhea, changes in vision.   CARDIO: Denies chest pain, palpitations.  PULM: Denies shortness of breath, cough.   GI: Endorses abdominal pain, constipation.  Denies nausea, vomiting, diarrhea, melena,  hematochezia.  : Denies painful urination, frequency, hematuria.   MSK: Denies recent trauma.   SKIN: Denies rash, lesions.   ENDOCRINE: Denies unexpected weight-loss.   HEME: Denies bleeding disorder.     PAST MEDICAL HISTORY   No past medical history on file.  Denies  SURGICAL HISTORY     No past surgical history on file.  No abdominal surgeries  ALLERGIES     Patient has no known allergies.    FAMILY HISTORY     No family history on file.  Reviewed and noncontributory  SOCIAL HISTORY       Social History     Socioeconomic History    Marital status:      Spouse name: Not on file    Number of children: Not on file    Years of education: Not on file    Highest education level: Not on file   Occupational History    Not on file   Tobacco Use    Smoking status: Not on file    Smokeless tobacco: Not on file   Substance and Sexual Activity    Alcohol use: Not on file    Drug use: Not on file    Sexual activity: Not on file   Other Topics Concern    Not on file   Social History Narrative    Not on file     Social Determinants of Health     Financial Resource Strain: Not on file   Food Insecurity: Not on file   Transportation Needs: Not on file   Physical Activity: Not on file   Stress: Not on file   Social Connections: Not on file   Intimate Partner Violence: Not on file       PHYSICAL EXAM   VS: As documented in the triage note from today's date and EMR flowsheet were reviewed.  Gen: Well developed. No acute distress. Seated in bed. Appears nontoxic.   Skin: Warm. Dry. Intact. No rashes or lesions.  Eyes: Pupils equally round and reactive to light. Clear sclera.   HENT: Atraumatic appearance. Mucosal membranes moist. No oral lesions, uvula midline, airway patent.   CV: Regular rate and regular rhythm. S1, S2. No pedal edema. Warm extremities.  Resp: Nonlabored breathing Clear to auscultation bilaterally. No increased work of breathing.   GI: Soft and mild tenderness bilateral lower quadrants no acute abdomen  Navarrete sign McBurney's point tenderness is negative psoas sign negative.  No CVA tenderness. No rebound or guarding. Bowel sounds x4 present.   MSK: Symmetric muscle bulk. No joint swelling in the extremities. Compartments are soft. Neurovascularly intact x4 extremities. Radial pulses +2 equal bilaterally.  Pedal pulses +2 equal bilaterally.  5 out of 5 strength at the hip knee ankle toe joints Achilles patellar reflexes +2 equal bilaterally.  No point tenderness throughout the CTL spine.  No step-offs.  Neuro: Alert. Speech fluent. Moving all extremities. No focal deficits. Gait normal.  No truncal instability.  Psych: Appropriate. Kempt.    DIAGNOSTIC RESULTS   RADIOLOGY:   Non-plain film images such as CT, Ultrasound and MRI are read by the radiologist. Plain radiographic images are visualized and preliminarily interpreted by the emergency physician with the below findings:      Interpretation per the Radiologist below, if available at the time of this note:    CT abdomen pelvis w IV contrast   Final Result   No evidence of bowel obstruction or acute appendicitis.        Marked urinary bladder wall thickening; clinical correlation is   recommended. Mild air in the urinary bladder may relate to recent   catheterization, however clinical correlation is recommended.        Limited evaluation of the uterus and adnexa. There is enlargement of   the uterus and prominent vascularity in the uterine wall; please see   the dedicated pelvic ultrasound report.        Trace pleural effusions and basilar atelectasis and basilar   interstitial edema.        MACRO:   None        Signed by: Xiang Posadas 3/6/2024 11:28 PM   Dictation workstation:   [id number]      US pelvis   Final Result   Limited examination with only transabdominal images performed.   Thickening of the endometrial stripe which measures 1.6 cm in   thickness. There is question of focus of internal color flow within   the endometrial canal versus artifact.   Short-term progress   ultrasound is recommended for further evaluation with endovaginal   imaging to exclude retained products of conception given the clinical   history and limitations of the transabdominal only examination.        MACRO:   None        Signed by: Xiang Posadas 3/6/2024 10:24 PM   Dictation workstation:   [id number]            ED BEDSIDE ULTRASOUND:   Performed by ED Physician - none    LABS:  Labs Reviewed   CBC WITH AUTO DIFFERENTIAL - Abnormal       Result Value    WBC 9.7      nRBC 0.0      RBC 3.48 (*)     Hemoglobin 10.2 (*)     Hematocrit 31.0 (*)     MCV 89      MCH 29.3      MCHC 32.9      RDW 16.0 (*)     Platelets 222      Neutrophils % 76.7      Immature Granulocytes %, Automated 1.2 (*)     Lymphocytes % 13.9      Monocytes % 6.2      Eosinophils % 1.6      Basophils % 0.4      Neutrophils Absolute 7.40      Immature Granulocytes Absolute, Automated 0.12      Lymphocytes Absolute 1.34      Monocytes Absolute 0.60      Eosinophils Absolute 0.15      Basophils Absolute 0.04     COMPREHENSIVE METABOLIC PANEL - Abnormal    Glucose 75      Sodium 139      Potassium 3.6      Chloride 110 (*)     Bicarbonate 18 (*)     Anion Gap 15      Urea Nitrogen 7      Creatinine 0.53      eGFR >90      Calcium 8.5 (*)     Albumin 3.0 (*)     Alkaline Phosphatase 107      Total Protein 5.5 (*)     AST 35      Bilirubin, Total 0.4      ALT 24     URINALYSIS WITH REFLEX MICROSCOPIC - Abnormal    Color, Urine Yellow      Appearance, Urine Hazy (*)     Specific Gravity, Urine 1.006      pH, Urine 6.0      Protein, Urine NEGATIVE      Glucose, Urine NEGATIVE      Blood, Urine LARGE (3+) (*)     Ketones, Urine NEGATIVE      Bilirubin, Urine NEGATIVE      Urobilinogen, Urine <2.0      Nitrite, Urine NEGATIVE      Leukocyte Esterase, Urine MODERATE (2+) (*)    MICROSCOPIC ONLY, URINE - Abnormal    WBC, Urine 6-10 (*)     RBC, Urine >20 (*)     Squamous Epithelial Cells, Urine 1-9 (SPARSE)     LIPASE - Normal     Lipase 34      Narrative:     Venipuncture immediately after or during the administration of Metamizole may lead to falsely low results. Testing should be performed immediately prior to Metamizole dosing.       All other labs were within normal range or not returned as of this dictation.    EMERGENCY DEPARTMENT COURSE/MDM:   Vitals:    Vitals:    03/07/24 0000 03/07/24 0030 03/07/24 0100 03/07/24 0230   BP: 135/79 132/82 112/71 107/64   BP Location:    Right arm   Patient Position:    Lying   Pulse: 67 66 70 75   Resp:       SpO2: 100% 98% 97% 98%   Weight:       Height:           I reviewed the patient's triage vitals and they are within normal range.    Due to the above findings the following was ordered basic labs to include ultrasound imaging and CT imaging abdomen pelvis.    Lab work reveals findings concerning for UTI was covered with Rocephin.  Mild anemia although close to baseline no active signs of bleeding at this time no leukocytosis making infectious etiology systemic less likely.  As no significant electrolyte derangements renal function is appropriate.  Ultrasound imaging cannot rule out retained products although do have a lower concern at this time will need close follow-up.  CT imaging shows no signs of obstruction possible cystitis consistent with urinalysis already covered with Rocephin.  On clinical examination she has no neurodeficits I do have a low very low concern for epidural hematoma or spinal pathology at this time.  On my interpretation of the CT imaging there is a significant amount of urine in the bladder I did have nursing staff catheterize the patient and we had greater than 2 L output.  She was also given an enema with mag citrate and began having stools.  She is still uncomfortable therefore I did speak with initially medicine service at [facility] per the patient's request they felt that since patient was just discharged she would be better served on the gynecological  services.  Spoke with Dr. Galeas who is agreed to accept the patient in transfer.  All workup and findings were discussed.  Patient's pain did improve after bladder was thoroughly drained.  She is stating that she would like to leave at this time.  It was discussed with her that she would likely need at minimum keep the catheter as she is at risk of additional retention.  She is not amendable to this.  She states that she needs to care for her child at home and needs to leave now.    Patient was given appropriate urological and gynecological follow-up.  She is aware the risks.        ED Course as of 03/07/24 1659   Wed Mar 06, 2024   2320 Patient did have epidural approximately 3 days ago she has no point tenderness throughout her back no neurological dysfunction x 4 extremities 5 out of 5 strength at the hip knee ankle toe joints Achilles patellar reflexes +2 regular.  I have a low suspicion for spinal pathology at this time including epidural hematoma it be unusual to have isolated urinary retention and constipation alone without any additional neurological findings.  Will place Geller catheter to drain the bladder and perform soapsuds enema at this time. [MG]   u Mar 07, 2024   2932 I was called to the bedside and informed that the patient would like to leave AGAINST MEDICAL ADVICE at this time.     The patient is oriented to person, place, and time, demonstrating all four key elements of capacity to make decisions regarding the medical care offered.   The patient speaks coherently and exhibits no evidence of having an altered level of consciousness or alcohol or drug intoxication to a point that would impair ability to delineate a choice.    The patient demonstrates understanding and appreciation of the relevant information of the nature of their medical condition, as well as the risks, benefits, and treatment alternatives (including non-treatment), consequences of refusing care, and can appropriately  communicate a rational reasoning about their choice of care options.   Patient is aware the suspected diagnosis suggested by history and exam. The patient demonstrates understanding and appreciation of the relevant information of the nature of their medical condition, as well as the risks, benefits, and treatment alternatives (including non-treatment), consequences of refusing care, and can appropriately communicate a rational reasoning about their choice of care options. The risks of refusing recommended care that were disclosed and acknowledged by the patient include death, neurologic dysfunction, permanent mental impairment, loss of limb, loss of sexual function, loss of current lifestyle, worsening of chronic condition, long-term disability. The patient understands they are welcome to return to the hospital at any time to receive the recommended care or any other care at any time, regardless of their ability to pay for such care. Discharge instructions were provided to the patient.   [MG]      ED Course User Index  [MG] Riley Mcdermott DO         Diagnoses as of 03/07/24 1659   Constipation, unspecified constipation type   Abdominal pain, unspecified abdominal location   Urinary retention       Patient was counseled regarding labs, imaging, likely diagnosis, and plan. All questions were answered.     ------------------------------------------------------------------  Information provided by the patient and spouse  Consults gynecology at [facility]  Due to social and economic determinants Hebrew speaking  Past medical history complicating workup postpartum 3 days  Previous medical records reviewed [facility] labs and note from today  Considered MRI imaging although not indicated  ------------------------------------------------------------------  ED Medications administered this visit:    Medications   acetaminophen (Tylenol) tablet 650 mg (650 mg oral Given 3/6/24 2217)   iohexol (OMNIPaque) 350 mg iodine/mL solution 75  mL (75 mL intravenous Given 3/6/24 2243)   cefTRIAXone (Rocephin) IVPB 1 g (0 g intravenous Stopped 3/7/24 0122)   lidocaine 2% jelly 1 Application (0 Applications urethral Override Pull 3/6/24 0026)   magnesium citrate solution 296 mL (296 mL oral Given 3/7/24 0336)        Final Impression:   1. Constipation, unspecified constipation type    2. Abdominal pain, unspecified abdominal location    3. Urinary retention          Riley Mcdermott DO    (Please note that portions of this note were completed with a voice recognition program.  Efforts were made to edit the dictations but occasionally words are mis-transcribed.)     Riley Mcdermott DO  03/07/24 1700

## 2024-03-07 NOTE — DISCHARGE INSTRUCTIONS
As discussed I am concerned that you will continue to retain urine with the Geller catheter out although you have asked us to remove it.  You are having bowel movements this is reassuring.  Please follow-up with your provider in the a.m. as you stated you already have an appointment.  Follow-up with urology as well further urinary retention.  Should you been experiencing weakness numbness tingling fevers additional urinary retention no passage of stool call 911 or return immediately